# Patient Record
Sex: MALE
[De-identification: names, ages, dates, MRNs, and addresses within clinical notes are randomized per-mention and may not be internally consistent; named-entity substitution may affect disease eponyms.]

---
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
## 2022-01-20 ENCOUNTER — APPOINTMENT (OUTPATIENT)
Dept: OTOLARYNGOLOGY | Facility: CLINIC | Age: 22
End: 2022-01-20
Payer: COMMERCIAL

## 2022-01-20 DIAGNOSIS — J35.01 CHRONIC TONSILLITIS: ICD-10-CM

## 2022-01-20 PROCEDURE — 99212 OFFICE O/P EST SF 10 MIN: CPT

## 2022-01-20 NOTE — REASON FOR VISIT
[Subsequent Evaluation] : a subsequent evaluation for [Tonsillitis] : tonsillitis [FreeTextEntry2] : throat

## 2022-01-20 NOTE — HISTORY OF PRESENT ILLNESS
[de-identified] : MELISSA ESTEVEZ is a 21 year man with a history of enalrged tonsils and recurrent infection. He recently completed 3 weeks of Cefdinir and feels well.

## 2023-06-22 ENCOUNTER — EMERGENCY (EMERGENCY)
Facility: HOSPITAL | Age: 23
LOS: 1 days | Discharge: HOME | End: 2023-06-22
Attending: EMERGENCY MEDICINE | Admitting: EMERGENCY MEDICINE
Payer: COMMERCIAL

## 2023-06-22 VITALS
DIASTOLIC BLOOD PRESSURE: 67 MMHG | SYSTOLIC BLOOD PRESSURE: 115 MMHG | TEMPERATURE: 98 F | HEART RATE: 86 BPM | RESPIRATION RATE: 18 BRPM | OXYGEN SATURATION: 99 %

## 2023-06-22 VITALS
WEIGHT: 145.06 LBS | RESPIRATION RATE: 22 BRPM | OXYGEN SATURATION: 91 % | HEIGHT: 62 IN | HEART RATE: 118 BPM | TEMPERATURE: 98 F

## 2023-06-22 PROCEDURE — 96375 TX/PRO/DX INJ NEW DRUG ADDON: CPT

## 2023-06-22 PROCEDURE — 94640 AIRWAY INHALATION TREATMENT: CPT

## 2023-06-22 PROCEDURE — 99284 EMERGENCY DEPT VISIT MOD MDM: CPT | Mod: 25

## 2023-06-22 PROCEDURE — 96372 THER/PROPH/DIAG INJ SC/IM: CPT | Mod: XU

## 2023-06-22 PROCEDURE — 96374 THER/PROPH/DIAG INJ IV PUSH: CPT

## 2023-06-22 PROCEDURE — 99284 EMERGENCY DEPT VISIT MOD MDM: CPT

## 2023-06-22 RX ORDER — DIPHENHYDRAMINE HCL 50 MG
50 CAPSULE ORAL ONCE
Refills: 0 | Status: COMPLETED | OUTPATIENT
Start: 2023-06-22 | End: 2023-06-22

## 2023-06-22 RX ORDER — FAMOTIDINE 10 MG/ML
20 INJECTION INTRAVENOUS ONCE
Refills: 0 | Status: COMPLETED | OUTPATIENT
Start: 2023-06-22 | End: 2023-06-22

## 2023-06-22 RX ORDER — IPRATROPIUM/ALBUTEROL SULFATE 18-103MCG
3 AEROSOL WITH ADAPTER (GRAM) INHALATION ONCE
Refills: 0 | Status: COMPLETED | OUTPATIENT
Start: 2023-06-22 | End: 2023-06-22

## 2023-06-22 RX ADMIN — Medication 125 MILLIGRAM(S): at 20:50

## 2023-06-22 RX ADMIN — Medication 50 MILLIGRAM(S): at 21:15

## 2023-06-22 RX ADMIN — Medication 3 MILLILITER(S): at 20:56

## 2023-06-22 RX ADMIN — FAMOTIDINE 20 MILLIGRAM(S): 10 INJECTION INTRAVENOUS at 20:50

## 2023-06-22 NOTE — ED ADULT NURSE NOTE - NSFALLUNIVINTERV_ED_ALL_ED
Bed/Stretcher in lowest position, wheels locked, appropriate side rails in place/Call bell, personal items and telephone in reach/Instruct patient to call for assistance before getting out of bed/chair/stretcher/Non-slip footwear applied when patient is off stretcher/[place] to call system/Physically safe environment - no spills, clutter or unnecessary equipment/Purposeful proactive rounding/Room/bathroom lighting operational, light cord in reach

## 2023-06-22 NOTE — ED ADULT TRIAGE NOTE - NSTRIAGECARE_GEN_A_ER

## 2023-06-22 NOTE — ED PROVIDER NOTE - PATIENT PORTAL LINK FT
You can access the FollowMyHealth Patient Portal offered by [facility] by registering at the following website: [email or web address]. By joining [employer]’s FollowMyHealth portal, you will also be able to view your health information using other applications (apps) compatible with our system.

## 2023-06-22 NOTE — ED ADULT NURSE NOTE - OBJECTIVE STATEMENT
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

## 2023-06-22 NOTE — ED PROVIDER NOTE - CARE PROVIDER_API CALL
Waqas Jenkins  Allergy and Immunology  [address], [address]  [place] [address]  Phone: [phone]  Fax: [phone]  Follow Up Time:

## 2023-06-22 NOTE — ED PROVIDER NOTE - OBJECTIVE STATEMENT
I patient presents with allergic reaction.  Started about 30 minutes after eating rice allergies to station chicken but did not do anything that he knows he is allergic to.  Developed some shortness of breath and itching.  Has redness to the face.  No difficulty speaking or swallowing.  Patient did not take any medications.  I

## 2023-06-22 NOTE — ED PROVIDER NOTE - NSFOLLOWUPINSTRUCTIONS_ED_ALL_ED_FT
Food Allergy  Foods that are high in protein, including nuts, peanut butter, cheese, chicken, fish, beans, yogurt, and milk.  A food allergy is an abnormal reaction to a food (food allergen) by the body's defense system (immune system). Foods that commonly cause allergies include:  Milk.  Seafood.  Eggs.  Peanuts.  Wheat.  Soy.  Tree nuts such as pecans, walnuts, and cashews.  What are the causes?  Food allergies happen when the immune system sees a food as harmful and releases proteins (antibodies) to fight it.    What are the signs or symptoms?  Symptoms may be mild or severe. They usually start minutes after eating the food, but they can occur even a few hours later. In people with a severe allergy, symptoms can start within seconds.    Mild symptoms of this condition include:  Congested nose.  Tingling in the mouth.  An itchy, red rash.  Vomiting.  Diarrhea.  In people with a severe allergy, a life-threatening reaction can occur called anaphylaxis. Get help right away if you have symptoms of anaphylaxis, such as:  Feeling warm in the face (flushed). This may include redness.  Itchy, red, swollen areas of skin (hives).  Swelling of the eyes, lips, face, mouth, tongue, or throat.  Difficulty breathing, speaking, or swallowing.  Noisy breathing, high-pitched whistling sounds when you breathe, most often when you breathe out (wheezing).  Dizziness, light-headedness, or fainting.  Pain or cramping in the abdomen.  How is this diagnosed?  This condition may be diagnosed based on:  A physical exam.  Your medical history.  Skin tests.  Blood tests.  A food challenge test. This test involves eating the food that may be causing the allergic response while being monitored for a reaction by your health care provider.  The results of an elimination diet. The elimination diet involves removing foods from your diet and then adding them back in, one at a time.  A food diary.  How is this treated?  There is no cure for food allergies. Treatment focuses on preventing exposure to the food or foods you are allergic to and treating reactions if you are exposed to the food. Mild symptoms may not need treatment.    Severe reactions usually need to be treated at a hospital. Treatment may include:  Medicines that help:  Tighten your blood vessels (epinephrine).  Relieve itching and hives (antihistamines).  Widen the narrow and tight airways (bronchodilators).  Reduce swelling (corticosteroids).  Oxygen therapy to help you breathe.  IV fluids to keep you hydrated.  After a severe reaction, you may be given rescue medicines, such as:  An anaphylaxis kit.  An epinephrine injection, commonly called an auto-injector "pen" (pre-filled automatic epinephrine injection device).  Your health care provider may teach you how to use these if you are accidentally exposed to an allergen.    Follow these instructions at home:  If you have a potential allergy:    Follow the elimination diet as told by your health care provider.  Keep a food diary as told by your health care provider. Every day, write down:  What you eat and drink and when.  What symptoms you have and when.  If you have a severe allergy:    A person using an auto-injector pen in the thigh.  Wear a medical alert bracelet or necklace that describes your allergy.  Carry your anaphylaxis kit or an auto-injector pen with you at all times. Use them as told by your health care provider.  Make sure that you, your family members, and your employer know:  The signs of anaphylaxis.  How to use an anaphylaxis kit.  How to use an auto-injector pen.  If you think that you are having an anaphylactic reaction, use your auto-injector pen or anaphylaxis kit.  Replace your epinephrine immediately after you use your auto-injector pen. This is important if you have another reaction. If possible, carry two epinephrine auto-injector pens.  Get medical care after using your auto-injector pen. This is important because you can have a delayed, life-threatening reaction after taking the medicine (rebound anaphylaxis).  General instructions    Avoid the foods that you are allergic to.  Read food labels before you eat packaged items. Look for ingredients that you are allergic to.  When you are at a restaurant, tell your  that you have an allergy. If you are unsure whether a meal has an ingredient that you are allergic to, ask your .  Take over-the-counter and prescription medicines only as told by your health care provider.  Do not drive or operate machinery until your health care provider says that it is safe.  Inform all of your health care providers that you have a food allergy.  Work with your health care providers to make an anaphylaxis plan. Preparation is important.  If you think that you might be allergic to something else, talk with your health care provider. Do not eat a food to see if you are allergic to it without talking with your health care provider first.  Contact a health care provider if:  You have symptoms that do not go away within 2 days.  You have symptoms that get worse.  You have new symptoms.  Get help right away if you have symptoms of anaphylaxis:  Flushed skin.  Hives.  Swelling of the eyes, lips, face, mouth, tongue, or throat.  Difficulty breathing, speaking, or swallowing.  Wheezing.  Dizziness, light-headedness or fainting.  Pain or cramping in the abdomen.  These symptoms may represent a serious problem that is an emergency. Do not wait to see if the symptoms will go away. Use your auto-injector pen or anaphylaxis kit as you have been told. Get medical help right away. Call your local emergency services (911 in the [place] Do not drive yourself to the hospital.    If you needed to use an auto-injector pen, you need more medical care even if the medicine seems to be helping. This is important because anaphylaxis may happen again within 72 hours.    Summary  A food allergy is an abnormal reaction to a food (food allergen) by the body's defense system (immune system).  There is no cure for food allergies. Treatment focuses on preventing exposure to the food or foods you are allergic to and treating reactions if you are exposed to the food.  Wear a medical alert bracelet or necklace that describes your allergy.  If you have symptoms of anaphylaxis, use your auto-injector pen or anaphylaxis kit as you have been instructed, and get medical help right away.  This information is not intended to replace advice given to you by your health care provider. Make sure you discuss any questions you have with your health care provider.

## 2023-06-27 ENCOUNTER — EMERGENCY (EMERGENCY)
Facility: HOSPITAL | Age: 23
LOS: 0 days | Discharge: HOME | End: 2023-06-27
Attending: EMERGENCY MEDICINE
Payer: COMMERCIAL

## 2023-06-27 VITALS — HEIGHT: 62 IN | WEIGHT: 145.06 LBS

## 2023-06-27 VITALS
RESPIRATION RATE: 18 BRPM | SYSTOLIC BLOOD PRESSURE: 146 MMHG | DIASTOLIC BLOOD PRESSURE: 79 MMHG | HEART RATE: 88 BPM | OXYGEN SATURATION: 100 %

## 2023-06-27 DIAGNOSIS — R07.89 OTHER CHEST PAIN: ICD-10-CM

## 2023-06-27 DIAGNOSIS — R06.2 WHEEZING: ICD-10-CM

## 2023-06-27 DIAGNOSIS — Z91.013 ALLERGY TO SEAFOOD: ICD-10-CM
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
## 2023-06-27 DIAGNOSIS — T78.40XA ALLERGY, UNSPECIFIED, INITIAL ENCOUNTER: ICD-10-CM

## 2023-06-27 DIAGNOSIS — Z91.018 ALLERGY TO OTHER FOODS: ICD-10-CM
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
## 2023-06-27 DIAGNOSIS — R06.02 SHORTNESS OF BREATH: ICD-10-CM

## 2023-06-27 PROCEDURE — 99283 EMERGENCY DEPT VISIT LOW MDM: CPT

## 2023-06-27 PROCEDURE — 99284 EMERGENCY DEPT VISIT MOD MDM: CPT

## 2023-06-27 RX ORDER — ALBUTEROL 90 UG/1
2 AEROSOL, METERED ORAL
Qty: 1 | Refills: 3
Start: 2023-06-27

## 2023-06-27 RX ORDER — ALBUTEROL 90 UG/1
3 AEROSOL, METERED ORAL
Qty: 5 | Refills: 0
Start: 2023-06-27

## 2023-06-27 RX ORDER — EPINEPHRINE 0.3 MG/.3ML
0.3 INJECTION INTRAMUSCULAR; SUBCUTANEOUS
Qty: 1 | Refills: 0
Start: 2023-06-27 | End: 2023-06-27

## 2023-06-27 RX ORDER — FAMOTIDINE 10 MG/ML
20 INJECTION INTRAVENOUS
Refills: 0 | Status: DISCONTINUED | OUTPATIENT
Start: 2023-06-27 | End: 2023-06-27

## 2023-06-27 RX ADMIN — FAMOTIDINE 20 MILLIGRAM(S): 10 INJECTION INTRAVENOUS at 10:14

## 2023-06-27 RX ADMIN — Medication 60 MILLIGRAM(S): at 10:14

## 2023-06-27 NOTE — ED PROVIDER NOTE - OBJECTIVE STATEMENT
23-year-old male, with past medical history significant for allergies to poultry and seafood, presents emergency department for allergic reaction onset 8:45 AM.  The patient was working with food at work and started having wheezing, shortness of breath, chest tightness, lip and tongue swelling.  Patient went home and took 50 of Benadryl and used his EpiPen.  Last had a reaction similar to this a week ago and was seen in the ED.  Was given nebs by EMS.

## 2023-06-27 NOTE — ED PROVIDER NOTE - PATIENT PORTAL LINK FT
You can access the FollowMyHealth Patient Portal offered by [facility] by registering at the following website: [email or web address]. By joining [employer]’s FollowMyHealth portal, you will also be able to view your health information using other applications (apps) compatible with our system.

## 2023-06-27 NOTE — ED PROVIDER NOTE - PHYSICAL EXAMINATION
GENERAL: Well-developed; well-nourished; in no acute distress.   SKIN: warm, dry  HEAD: Normocephalic; atraumatic.  EYES: PERRLA, EOMI, no conjunctival erythema  ENT: Lower lip swelling. No nasal discharge; airway clear, uvula midline  NECK: Supple; non tender.  CARD: S1, S2 normal; no murmurs, gallops, or rubs. Regular rate and rhythm.   RESP: LCTAB; No wheezes, rales, rhonchi, or stridor.  ABD: soft, nontender, and nondistended  EXT: Normal ROM.  No LE TTP or edema bilaterally.  LYMPH: No acute cervical adenopathy.  NEURO: Alert, oriented, grossly unremarkable  PSYCH: Cooperative, appropriate.

## 2023-06-27 NOTE — ED PROVIDER NOTE - ATTENDING CONTRIBUTION TO CARE
23-year-old male to ED with allergic reaction.  Patient was eating a bagel from a deli started having wheezing shortness of breath and chest tightness with lower lip swelling.  Took 50 Benadryl use EpiPen and called EMS.On arrival symptoms are rapidly improving.  Exam as noted.

## 2023-06-27 NOTE — ED ADULT NURSE NOTE - OBJECTIVE STATEMENT
Pt started feeling SOB and lip swelling. Self administered ept at 9:20am    pt presents to ED with allergic reaction, pt self administered epi himself at 9:30am.   Pt A&Ox4 calm, breathing with ease in room air.  Family by bedside   Nursing care continues

## 2024-01-11 NOTE — ED ADULT TRIAGE NOTE - ESI TRIAGE ACUITY LEVEL, MLM
Pt tolerated IV hydration well without incident. Central line dressing changed per protocol without incident. Discharged in stable condition and pt aware of next infusion appointment tomorrow at 8:30AM. AVS declined.  
2

## 2024-02-20 ENCOUNTER — NON-APPOINTMENT (OUTPATIENT)
Age: 24
End: 2024-02-20